# Patient Record
Sex: MALE | Employment: UNEMPLOYED | URBAN - METROPOLITAN AREA
[De-identification: names, ages, dates, MRNs, and addresses within clinical notes are randomized per-mention and may not be internally consistent; named-entity substitution may affect disease eponyms.]

---

## 2018-10-12 ENCOUNTER — TRANSFERRED RECORDS (OUTPATIENT)
Dept: HEALTH INFORMATION MANAGEMENT | Facility: CLINIC | Age: 48
End: 2018-10-12

## 2018-10-18 ENCOUNTER — HOSPITAL ENCOUNTER (OUTPATIENT)
Dept: PHYSICAL THERAPY | Facility: CLINIC | Age: 48
Setting detail: THERAPIES SERIES
End: 2018-10-18
Attending: PHYSICAL MEDICINE & REHABILITATION
Payer: COMMERCIAL

## 2018-10-18 PROCEDURE — 97162 PT EVAL MOD COMPLEX 30 MIN: CPT | Mod: GP | Performed by: PHYSICAL THERAPIST

## 2018-10-18 PROCEDURE — 97112 NEUROMUSCULAR REEDUCATION: CPT | Mod: GP | Performed by: PHYSICAL THERAPIST

## 2018-10-18 PROCEDURE — 40000719 ZZHC STATISTIC PT DEPARTMENT NEURO VISIT: Performed by: PHYSICAL THERAPIST

## 2018-10-18 ASSESSMENT — 6 MINUTE WALK TEST (6MWT)
COMMENTS: WITH SPC
TOTAL DISTANCE WALKED (FT): 500

## 2018-10-18 NOTE — PROGRESS NOTES
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 Originally diatnosed in 2009. S/p resection and chemo following multiple recurrances. Was doing fairly well until noticed increased weakness over the summer. Was working until the end of April but then had a seizure. Following, he had more diffiuclty with his L side weakness and difficulty with gait. Reports that he has a lot of stiffness in UE and LE along with significant pain in L UE   Prior level of function comment was ambulating without a cane and working prior to April   Current Community Support Family/friend caregiver   Patient role/Employment history Disabled  ()   Living environment Apartment/condo  (with spouse and 2 children)   Home/Community Accessibility Comments no stairs   Current Assistive Devices Standard Cane   ADL Devices Shower/Tub Grab Bar;Extended Tub Bench   Patient/Family Goals Statement Improve balance and gait   Fall Risk Screen   Fall screen completed by PT   Have you fallen 2 or more times in the past year? No   Have you fallen and had an injury in the past year? No   Timed Up and Go score (seconds) 22.3   Is patient a fall risk? Yes   Pain   Patient currently in pain Yes   Pain comments significant pain in L UE/shoulder, increasing with movement, walking. Prefers to have UE in a sling    Cognitive Status Examination   Orientation orientation to person, place and time   Cognitive Comment decreased eye contact, Rt gaze preference, neglect   Posture   Posture Forward head position   Posture Comments forward L shoulder, L UE in sling   Range of Motion (ROM)   ROM Comment tightness in L posterior LE including gastroc and hamstring   Strength   Strength Comments Grossly 4+/5, able to isolate, quad, hip and dorsiflexion activation   Bed Mobility   Bed Mobility Comments Min assist with supine>sit   Transfer Skills   Transfer Comments Mod I, requires UE support   Gait   Gait Comments Ambulates with Rt lean, maintains slight near flexion,decreased heel strike   Gait Special Tests   Gait Special Tests SIX MINUTE WALK TEST   Gait Special Tests Six Minute Walk Test   Feet 500 Feet   Comments with SPC   Balance Special Tests   Balance Special Tests Romberg   Balance Special Tests Romberg   Seconds 18 Seconds   Sensory Examination   Sensory Perception Comments reports numbness on L side of body   Coordination   Coordination Comments impaired LLE coordination   Muscle Tone   Muscle Tone Comments Increased tone in LLE into hip extension, has spasms into flexion   Planned Therapy Interventions   Planned Therapy Interventions balance training;bed mobility training;gait training;neuromuscular re-education;motor coordination training;ROM;strengthening;stretching   Clinical Impression   Criteria for Skilled Therapeutic Interventions Met yes, treatment indicated   PT Diagnosis Impaired balance and gait   Influenced by the following impairments weakness, pain, instability, tone, numbness   Functional limitations due to impairments difficulty with bed mobility, transfers, gait, fall risk   Clinical Presentation Evolving/Changing   Clinical Presentation Rationale recent onset, fluctuating pain and tone   Clinical Decision Making (Complexity) Moderate complexity   Therapy Frequency 2 times/day   Predicted Duration of  Therapy Intervention (days/wks) 60 days   Risk & Benefits of therapy have been explained Yes   Patient, Family & other staff in agreement with plan of care Yes   GOALS   PT Eval Goals 1;2;3   Goal 1   Goal Identifier 6MWT   Goal Description The client will ambulate >800' in 6 minutes, demonstrating an improvement in activity tolerance, thereby improving ability to ambulate in the community.   Target Date 12/17/18   Goal 2   Goal Identifier Romberg   Goal Description Due to improvements in balance, the pt will able to stand NBOS EC for 30 seconds.   Target Date 12/17/18   Goal 3   Goal Identifier HEP   Goal Description The client will be independent with a HEP and complete at least 4 days/week focusing on improving balance, strength, manage tone and improve and activity tolerance in order to improving functional mobility outside of therapy.   Target Date 12/17/18   Total Evaluation Time   Total Evaluation Time (Minutes) 40

## 2018-10-23 ENCOUNTER — HOSPITAL ENCOUNTER (OUTPATIENT)
Dept: OCCUPATIONAL THERAPY | Facility: CLINIC | Age: 48
Setting detail: THERAPIES SERIES
End: 2018-10-23
Attending: PHYSICAL MEDICINE & REHABILITATION
Payer: COMMERCIAL

## 2018-10-23 PROCEDURE — 40000125 ZZHC STATISTIC OT OUTPT VISIT: Performed by: REHABILITATION PRACTITIONER

## 2018-10-23 PROCEDURE — 97112 NEUROMUSCULAR REEDUCATION: CPT | Mod: GO | Performed by: REHABILITATION PRACTITIONER

## 2018-10-23 PROCEDURE — 97166 OT EVAL MOD COMPLEX 45 MIN: CPT | Mod: GO | Performed by: REHABILITATION PRACTITIONER

## 2018-10-24 NOTE — PROGRESS NOTES
10/23/18 0700   Quick Adds   Type of Visit Initial Outpatient Occupational Therapy Evaluation       Present Yes   Language Vietnamese  (Yes)   General Information   Start Of Care Date 10/23/18   Referring Physician Ute Bryant   Orders Evaluate and treat as indicated   Orders Date 10/12/18   Medical Diagnosis Malignant neoplasm overlapping brain site; CVA, unspecified mechanism; Left spastic hemiparesis   Onset of Illness/Injury or Date of Surgery 10/12/18   Precautions/Limitations Fall precautions;Other (see comments)  (pt has 1 finger width of shoulder sublux in LUE)   Surgical/Medical History Reviewed Yes   Additional Occupational Profile Info/Pertinent History of Current Problem The pt presents with with 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 Originally diatnosed in 2009. S/p resection and chemo following multiple recurrances. Was doing fairly well until noticed increased weakness over the summer. Was working until the end of April but then had a seizure. Following, he had more diffiuclty with his L side weakness and difficulty with gait. Reports that he has a lot of stiffness in UE and LE along with significant pain in L UE   Comments/Observations Wife reports that she is able to help pt with ADL/IADL when she is home but has to leave pt to watch their 2 children when she goes to work.  Wife reports fears of pt falling when she is gone.    Role/Living Environment   Current Community Support Family/friend caregiver   Patient role/Employment history Disabled  (currently not working but was working as a )   Community/Avocational Activities Pt wants to be able to get back to working and doing things for himself again.    Current Living Environment Apartment/condo   Number of Stairs to Enter Home no stairs   Primary Bathroom Set Up/Equipment Tub grab bar;Tub/Shower combo;Extended tub bench   Prior Level - ADLS Independent   Prior Responsibilities -  "IADL Work  (Pt became upset/tearful when asked about IADL tasks)   Current Assistive Devices - Mobility Standard cane   Role/Living Environment Comments Lives with wife and 2 children. Wife works.    Patient/family Goals Statement \"get back to doing everything\" ADL/IADL tasks (work, bathing, dressing are main areas)   Pain   Patient currently in pain Yes   Pain location LUE   (shoulder joint)   Pain rating 8/10 at rest    Pain description comment pain increases with movement of LUE   Fall Risk Screen   Fall screen completed by PT   Have you fallen 2 or more times in the past year? No   Have you fallen and had an injury in the past year? No   Is patient a fall risk? Yes   Cognitive Status Examination   Orientation Orientation to person, place and time   Level of Consciousness Alert   Follows Commands and Answers Questions 100% of the time;Able to follow single-step instructions   Personal Safety and Judgment Intact   Memory Intact   Cognitive Comment Right gaze preference, decreased awareness of left UE, anticipate inattention.  Cognition difficult to assess due to language barrier, will further asses through functional tasks.     Visual Perception   Visual Perception No deficits were identified   Sensation   Sensation Comments may need to assess LUE   Posture   Posture Forward head position   Posture Comments Pt reports that it is hard for him to keep is head up and looing forward   Range of Motion (ROM)   ROM Quick Adds Scapula;Shoulder, Left;Hand, General   ROM Comments 1/5 for shoulder flexion and adduction   Left Scapula ABduction ROM   Left Scapula ABduction AROM restricted   Left Scapula ADduction ROM   Left Scapula ADduction PROM restricted  (tightness/pain)   Left Scapula ADduction AROM restricted  (1/5)   Left Shoulder Flexion ROM   Left Shoulder Flexion PROM - degrees 45 degress   (passive)   Left Shoulder Flexion AROM - degrees 1/5    Left Shoulder ABduction ROM   Left Shoulder ABduction PROM - degrees 40 " degrees  (Passsive)   Left Shoulder External Rotation ROM   Left Shoulder External Rotation PROM - degrees to neutral, passively   Hand, General ROM   Hand, General Left Hand ROM   Left Hand AROM 0/5    Left Hand PROM full range    Hand, Detailed ROM   Hand Detailed ROM Left Hand AROM   Left Hand AROM   Left Thumb AROM - degrees at rest: 46 degrees flexion MP   Left Digit 2 AROM - degrees at rest: 60 degrees flexion MP; 60 degrees PIP   Left Digit 3 AROM - degrees at rest: 55 degrees flexion MP; 65 degrees flexion PIP   Left Digit 4 AROM - degrees at rest: 65 degrees flexion MP; 70 degress flexion PIP   Left Digit 5 AROM - degrees at rest: 65 degrees flexion MP; 75 degrees flexion PIP    Left Hand PROM   Left Thumb PROM - degrees Full range   (Passive )   Left Digit 2 PROM - degrees Full range    Left Digit 3 PROM - degrees Full range    Left Digit 4 PROM - degrees Full range    Left Digit 5 PROM - degrees Full range    Hand Strength   Hand Strength Comments Unable to perform any  or pinch assessments, no functional hand movement on left UE   Coordination   Coordination Comments No functional hand movement for coordination in left UE   Functional Mobility   Functional Mobility Comments uses standard cane    Transfer Skill   Level of [place]: Transfers stand-by assist   Transfer Skill: Toilet Transfer   Level of [place]: Toilet stand-by assist   Bathing   Level of [place] - Bathing minimum assist (75% patients effort)   Bathing Comments Needs help turning shower on/off    Upper Body Dressing   Level of [place]: Dress Upper Body dependent (less than 25% patients effort)   Upper Body Dressing Comments wife helps with dressing    Lower Body Dressing   Level of [place]: Dress Lower Body maximum assist (25% patients effort)   Toileting   Level of [place]: Toilet stand-by assist   Grooming   Level of [place]: Grooming independent   Eating/Self-Feeding   Level of [place]: Eating  minimum assist (75% patients effort)   Activity Tolerance   Activity Tolerance severe pain limits tolerance    Instrumental Activities of Daily Living Assessment   IADL Assessment/Observations Wife is currently helping with ADL/IADL tasks.  Pt is currently unable to work and want to get back to job as a .  Pt and wife report that he currently needs help with bathing, dressing, and IADL tasks.    Planned Therapy Interventions   Planned Therapy Interventions ADL training;IADL training;Joint mobilization;Neuromuscular re-education   Planned Modalities Hot/cold packs;Electrical stimulation;TENS   Adult OT Eval Goals   OT Eval Goals (Adult) 1;2;3;4;5;6;7   OT Goal 1   Goal Identifier 1-Dressing    Goal Description Pt will demonstrate kristopher dressing technique and be mod I with AE in UB and LB dressing for improved independence at home.   Target Date 01/22/18   OT Goal 2   Goal Identifier 2- LUE HEP    Goal Description Pt to demonstrate mod I with stretching HEP to improve overall ROM in LUE for improved independence with ADL/IADL tasks.     Target Date 01/22/18   OT Goal 3   Goal Identifier 3-Splint    Goal Description Pt with demonstrate mod independence with don/doffing splint for LUE for tone management and prevention of deformities to aid in increased independence for dressing tasks.    Target Date 01/22/18   OT Goal 4   Goal Identifier 4-Eating    Goal Description Pt will demonstrate mod I with eating using appropriate AE and one handed techniques for increased independence in meal prep and eating tasks.    Target Date 01/22/18   OT Goal 5   Goal Identifier 5 Fatigue/Pain management    Goal Description Pt will verbalize 3 strategies for pain/fatigue management for improved indepencence with dressing and IADL tasks.    Target Date 01/22/18   OT Goal 6   Goal Identifier 6 Inattention   Goal Description Pt will utilize 1-2 strategies to improve attention to left extra personal space and left UE to improve safety  with dynamic balance 3/3 trials   Target Date 01/22/18   OT Goal 7   Goal Identifier 7 simple meal prep   Goal Description Pt will demonstrate mod I with simple meal prep task, using adaptive equipment and safety kitchen mobility strategies.    Target Date 01/22/18   Clinical Impression   Criteria for Skilled Therapeutic Interventions Met Yes, treatment indicated   OT Diagnosis impaired ADL/IADL independence and safety    Influenced by the following impairments flaccid LUE, L shoulder subluxation, generalized weakness and fatigue, severe pain in LUE    Assessment of Occupational Performance 3-5 Performance Deficits   Identified Performance Deficits affects ADL/IADL, household/community mobility, work, recreational activities   Clinical Decision Making (Complexity) Moderate complexity   Therapy Frequency 2x/week for 45 min sessions   Predicted Duration of Therapy Intervention (days/wks) 12 weeks    Risks and Benefits of Treatment have been explained. Yes   Patient, Family & other staff in agreement with plan of care Yes   Clinical Impression Comments Pt's flaccid LUE, L shoulder sublux, and severe pain have significantly impaired independence in ADL/IADl/work tasks.    Education Assessment   Barriers To Learning Physical;Emotional   Preferred Learning Style Demonstration;Pictures/video;Listening  (listening when  present )   Total Evaluation Time   Total Evaluation Time 40

## 2018-10-31 ENCOUNTER — HOSPITAL ENCOUNTER (OUTPATIENT)
Dept: PHYSICAL THERAPY | Facility: CLINIC | Age: 48
Setting detail: THERAPIES SERIES
End: 2018-10-31
Attending: PHYSICAL MEDICINE & REHABILITATION
Payer: COMMERCIAL

## 2018-10-31 PROCEDURE — 40000360 ZZHC STATISTIC PT CANCER REHAB VISIT: Performed by: PHYSICAL THERAPIST

## 2018-10-31 PROCEDURE — 97116 GAIT TRAINING THERAPY: CPT | Mod: GP | Performed by: PHYSICAL THERAPIST

## 2018-10-31 PROCEDURE — 97110 THERAPEUTIC EXERCISES: CPT | Mod: GP | Performed by: PHYSICAL THERAPIST

## 2018-10-31 PROCEDURE — 97112 NEUROMUSCULAR REEDUCATION: CPT | Mod: GP | Performed by: PHYSICAL THERAPIST

## 2018-11-07 ENCOUNTER — HOSPITAL ENCOUNTER (OUTPATIENT)
Dept: OCCUPATIONAL THERAPY | Facility: CLINIC | Age: 48
Setting detail: THERAPIES SERIES
End: 2018-11-07
Attending: PHYSICAL MEDICINE & REHABILITATION
Payer: COMMERCIAL

## 2018-11-07 ENCOUNTER — HOSPITAL ENCOUNTER (OUTPATIENT)
Dept: PHYSICAL THERAPY | Facility: CLINIC | Age: 48
Setting detail: THERAPIES SERIES
End: 2018-11-07
Attending: PHYSICAL MEDICINE & REHABILITATION
Payer: COMMERCIAL

## 2018-11-07 PROCEDURE — 97112 NEUROMUSCULAR REEDUCATION: CPT | Mod: GO | Performed by: OCCUPATIONAL THERAPIST

## 2018-11-07 PROCEDURE — 97110 THERAPEUTIC EXERCISES: CPT | Mod: GO | Performed by: OCCUPATIONAL THERAPIST

## 2018-11-07 PROCEDURE — 97116 GAIT TRAINING THERAPY: CPT | Mod: GP | Performed by: PHYSICAL THERAPIST

## 2018-11-07 PROCEDURE — 97535 SELF CARE MNGMENT TRAINING: CPT | Mod: GO | Performed by: OCCUPATIONAL THERAPIST

## 2018-11-07 PROCEDURE — 97110 THERAPEUTIC EXERCISES: CPT | Mod: GP | Performed by: PHYSICAL THERAPIST

## 2018-11-07 PROCEDURE — 97112 NEUROMUSCULAR REEDUCATION: CPT | Mod: GP | Performed by: PHYSICAL THERAPIST

## 2018-11-07 PROCEDURE — 40000719 ZZHC STATISTIC PT DEPARTMENT NEURO VISIT: Performed by: PHYSICAL THERAPIST

## 2018-11-07 PROCEDURE — 40000125 ZZHC STATISTIC OT OUTPT VISIT: Performed by: OCCUPATIONAL THERAPIST

## 2018-11-09 ENCOUNTER — HOSPITAL ENCOUNTER (OUTPATIENT)
Dept: OCCUPATIONAL THERAPY | Facility: CLINIC | Age: 48
Setting detail: THERAPIES SERIES
End: 2018-11-09
Attending: PHYSICAL MEDICINE & REHABILITATION
Payer: COMMERCIAL

## 2018-11-09 ENCOUNTER — HOSPITAL ENCOUNTER (OUTPATIENT)
Dept: PHYSICAL THERAPY | Facility: CLINIC | Age: 48
Setting detail: THERAPIES SERIES
End: 2018-11-09
Attending: PHYSICAL MEDICINE & REHABILITATION
Payer: COMMERCIAL

## 2018-11-09 PROCEDURE — 97112 NEUROMUSCULAR REEDUCATION: CPT | Mod: GP | Performed by: PHYSICAL THERAPIST

## 2018-11-09 PROCEDURE — 40000719 ZZHC STATISTIC PT DEPARTMENT NEURO VISIT: Performed by: PHYSICAL THERAPIST

## 2018-11-09 PROCEDURE — 97112 NEUROMUSCULAR REEDUCATION: CPT | Mod: GO | Performed by: OCCUPATIONAL THERAPIST

## 2018-11-09 PROCEDURE — 97110 THERAPEUTIC EXERCISES: CPT | Mod: GP | Performed by: PHYSICAL THERAPIST

## 2018-11-09 PROCEDURE — 97110 THERAPEUTIC EXERCISES: CPT | Mod: GO | Performed by: OCCUPATIONAL THERAPIST

## 2018-11-09 PROCEDURE — 40000125 ZZHC STATISTIC OT OUTPT VISIT: Performed by: OCCUPATIONAL THERAPIST

## 2018-11-09 PROCEDURE — 97116 GAIT TRAINING THERAPY: CPT | Mod: GP | Performed by: PHYSICAL THERAPIST

## 2018-11-13 ENCOUNTER — HOSPITAL ENCOUNTER (OUTPATIENT)
Dept: OCCUPATIONAL THERAPY | Facility: CLINIC | Age: 48
Setting detail: THERAPIES SERIES
End: 2018-11-13
Attending: PHYSICAL MEDICINE & REHABILITATION
Payer: COMMERCIAL

## 2018-11-13 PROCEDURE — 97530 THERAPEUTIC ACTIVITIES: CPT | Mod: GO | Performed by: OCCUPATIONAL THERAPIST

## 2018-11-13 PROCEDURE — 40000125 ZZHC STATISTIC OT OUTPT VISIT: Performed by: OCCUPATIONAL THERAPIST

## 2018-11-15 ENCOUNTER — HOSPITAL ENCOUNTER (OUTPATIENT)
Dept: PHYSICAL THERAPY | Facility: CLINIC | Age: 48
Setting detail: THERAPIES SERIES
End: 2018-11-15
Attending: PHYSICAL MEDICINE & REHABILITATION
Payer: COMMERCIAL

## 2018-11-15 PROCEDURE — 97112 NEUROMUSCULAR REEDUCATION: CPT | Mod: GP | Performed by: PHYSICAL THERAPIST

## 2018-11-15 PROCEDURE — 97110 THERAPEUTIC EXERCISES: CPT | Mod: GP | Performed by: PHYSICAL THERAPIST

## 2018-11-15 PROCEDURE — 97116 GAIT TRAINING THERAPY: CPT | Mod: GP | Performed by: PHYSICAL THERAPIST

## 2018-11-15 PROCEDURE — 40000719 ZZHC STATISTIC PT DEPARTMENT NEURO VISIT: Performed by: PHYSICAL THERAPIST

## 2018-11-20 ENCOUNTER — HOSPITAL ENCOUNTER (OUTPATIENT)
Dept: PHYSICAL THERAPY | Facility: CLINIC | Age: 48
Setting detail: THERAPIES SERIES
End: 2018-11-20
Attending: PHYSICAL MEDICINE & REHABILITATION
Payer: COMMERCIAL

## 2018-11-20 PROCEDURE — 97112 NEUROMUSCULAR REEDUCATION: CPT | Mod: GP | Performed by: PHYSICAL THERAPIST

## 2018-11-20 PROCEDURE — 97110 THERAPEUTIC EXERCISES: CPT | Mod: GP | Performed by: PHYSICAL THERAPIST

## 2018-11-20 PROCEDURE — 97116 GAIT TRAINING THERAPY: CPT | Mod: GP | Performed by: PHYSICAL THERAPIST

## 2018-11-20 PROCEDURE — 40000719 ZZHC STATISTIC PT DEPARTMENT NEURO VISIT: Performed by: PHYSICAL THERAPIST

## 2018-11-21 ENCOUNTER — HOSPITAL ENCOUNTER (OUTPATIENT)
Dept: PHYSICAL THERAPY | Facility: CLINIC | Age: 48
Setting detail: THERAPIES SERIES
End: 2018-11-21
Attending: PHYSICAL MEDICINE & REHABILITATION
Payer: COMMERCIAL

## 2018-11-21 ENCOUNTER — HOSPITAL ENCOUNTER (OUTPATIENT)
Dept: OCCUPATIONAL THERAPY | Facility: CLINIC | Age: 48
Setting detail: THERAPIES SERIES
End: 2018-11-21
Attending: PHYSICAL MEDICINE & REHABILITATION
Payer: COMMERCIAL

## 2018-11-21 PROCEDURE — 97110 THERAPEUTIC EXERCISES: CPT | Mod: GP,XU | Performed by: PHYSICAL THERAPIST

## 2018-11-21 PROCEDURE — 40000125 ZZHC STATISTIC OT OUTPT VISIT: Performed by: REHABILITATION PRACTITIONER

## 2018-11-21 PROCEDURE — 97112 NEUROMUSCULAR REEDUCATION: CPT | Mod: GP | Performed by: PHYSICAL THERAPIST

## 2018-11-21 PROCEDURE — 97760 ORTHOTIC MGMT&TRAING 1ST ENC: CPT | Mod: GO | Performed by: REHABILITATION PRACTITIONER

## 2018-11-21 PROCEDURE — 97116 GAIT TRAINING THERAPY: CPT | Mod: GP | Performed by: PHYSICAL THERAPIST

## 2018-11-21 PROCEDURE — 97112 NEUROMUSCULAR REEDUCATION: CPT | Mod: GO | Performed by: REHABILITATION PRACTITIONER

## 2018-11-21 PROCEDURE — 40000719 ZZHC STATISTIC PT DEPARTMENT NEURO VISIT: Performed by: PHYSICAL THERAPIST

## 2018-11-28 ENCOUNTER — HOSPITAL ENCOUNTER (OUTPATIENT)
Dept: OCCUPATIONAL THERAPY | Facility: CLINIC | Age: 48
Setting detail: THERAPIES SERIES
End: 2018-11-28
Attending: PHYSICAL MEDICINE & REHABILITATION
Payer: COMMERCIAL

## 2018-11-28 ENCOUNTER — HOSPITAL ENCOUNTER (OUTPATIENT)
Dept: PHYSICAL THERAPY | Facility: CLINIC | Age: 48
Setting detail: THERAPIES SERIES
End: 2018-11-28
Attending: PHYSICAL MEDICINE & REHABILITATION
Payer: COMMERCIAL

## 2018-11-28 PROCEDURE — 97112 NEUROMUSCULAR REEDUCATION: CPT | Mod: GO | Performed by: REHABILITATION PRACTITIONER

## 2018-11-28 PROCEDURE — 97110 THERAPEUTIC EXERCISES: CPT | Mod: GP | Performed by: PHYSICAL THERAPIST

## 2018-11-28 PROCEDURE — 40000719 ZZHC STATISTIC PT DEPARTMENT NEURO VISIT: Performed by: PHYSICAL THERAPIST

## 2018-11-28 PROCEDURE — 40000125 ZZHC STATISTIC OT OUTPT VISIT: Performed by: REHABILITATION PRACTITIONER

## 2018-11-28 PROCEDURE — 97035 APP MDLTY 1+ULTRASOUND EA 15: CPT | Mod: GO | Performed by: REHABILITATION PRACTITIONER

## 2018-11-28 PROCEDURE — 97535 SELF CARE MNGMENT TRAINING: CPT | Mod: GO | Performed by: REHABILITATION PRACTITIONER

## 2018-11-28 PROCEDURE — 97112 NEUROMUSCULAR REEDUCATION: CPT | Mod: GP | Performed by: PHYSICAL THERAPIST

## 2018-11-28 PROCEDURE — 97116 GAIT TRAINING THERAPY: CPT | Mod: GP | Performed by: PHYSICAL THERAPIST

## 2018-12-05 ENCOUNTER — HOSPITAL ENCOUNTER (OUTPATIENT)
Dept: OCCUPATIONAL THERAPY | Facility: CLINIC | Age: 48
Setting detail: THERAPIES SERIES
End: 2018-12-05
Attending: PHYSICAL MEDICINE & REHABILITATION
Payer: COMMERCIAL

## 2018-12-05 ENCOUNTER — HOSPITAL ENCOUNTER (OUTPATIENT)
Dept: PHYSICAL THERAPY | Facility: CLINIC | Age: 48
Setting detail: THERAPIES SERIES
End: 2018-12-05
Attending: PHYSICAL MEDICINE & REHABILITATION
Payer: COMMERCIAL

## 2018-12-05 PROCEDURE — 97535 SELF CARE MNGMENT TRAINING: CPT | Mod: GO,XU | Performed by: REHABILITATION PRACTITIONER

## 2018-12-05 PROCEDURE — 97116 GAIT TRAINING THERAPY: CPT | Mod: GP,XU | Performed by: PHYSICAL THERAPIST

## 2018-12-05 PROCEDURE — 40000125 ZZHC STATISTIC OT OUTPT VISIT: Performed by: REHABILITATION PRACTITIONER

## 2018-12-05 PROCEDURE — 97112 NEUROMUSCULAR REEDUCATION: CPT | Mod: GP,XU | Performed by: PHYSICAL THERAPIST

## 2018-12-05 PROCEDURE — 97110 THERAPEUTIC EXERCISES: CPT | Mod: GP | Performed by: PHYSICAL THERAPIST

## 2018-12-05 PROCEDURE — 97530 THERAPEUTIC ACTIVITIES: CPT | Mod: GO | Performed by: REHABILITATION PRACTITIONER

## 2018-12-05 PROCEDURE — 40000719 ZZHC STATISTIC PT DEPARTMENT NEURO VISIT: Performed by: PHYSICAL THERAPIST

## 2018-12-05 PROCEDURE — 97760 ORTHOTIC MGMT&TRAING 1ST ENC: CPT | Mod: GO | Performed by: REHABILITATION PRACTITIONER

## 2018-12-13 ENCOUNTER — HOSPITAL ENCOUNTER (OUTPATIENT)
Dept: PHYSICAL THERAPY | Facility: CLINIC | Age: 48
Setting detail: THERAPIES SERIES
End: 2018-12-13
Attending: PHYSICAL MEDICINE & REHABILITATION
Payer: COMMERCIAL

## 2018-12-13 ENCOUNTER — HOSPITAL ENCOUNTER (OUTPATIENT)
Dept: OCCUPATIONAL THERAPY | Facility: CLINIC | Age: 48
Setting detail: THERAPIES SERIES
End: 2018-12-13
Attending: PHYSICAL MEDICINE & REHABILITATION
Payer: COMMERCIAL

## 2018-12-13 PROCEDURE — 97112 NEUROMUSCULAR REEDUCATION: CPT | Mod: GP | Performed by: PHYSICAL THERAPIST

## 2018-12-13 PROCEDURE — 97110 THERAPEUTIC EXERCISES: CPT | Mod: GP,XU | Performed by: PHYSICAL THERAPIST

## 2018-12-13 PROCEDURE — 97763 ORTHC/PROSTC MGMT SBSQ ENC: CPT | Mod: GO | Performed by: REHABILITATION PRACTITIONER

## 2018-12-14 NOTE — PROGRESS NOTES
Outpatient Physical Therapy Progress Note     Patient: Hugh Isabel  : 1970    Beginning/End Dates of Reporting Period:  10/12/18 to 2018    Referring Provider: Ute Smith Diagnosis: Impaired balance and gait     Client Self Report: The pt is tired today. Was called in to the hospital all day for testing because they though a test showed bleeding in the brain. Turned out to be an error.     Objective Measurements:  Objective Measure: 6MWT  Details: 700  Objective Measure: Romberg  Details: 10 seconds                   Goals:  Goal Identifier 6MWT   Goal Description The client will ambulate >800' in 6 minutes, demonstrating an improvement in activity tolerance, thereby improving ability to ambulate in the community.   Target Date 18   Date Met      Progress:     Goal Identifier Romberg   Goal Description Due to improvements in balance, the pt will able to stand NBOS EC for 30 seconds.   Target Date 18   Date Met      Progress:     Goal Identifier HEP   Goal Description The client will be independent with a HEP and complete at least 4 days/week focusing on improving balance, strength, manage tone and improve and activity tolerance in order to improving functional mobility outside of therapy.   Target Date 18   Date Met      Progress:   Progress Toward Goals:   Progress this reporting period: The pt has demonstrated improvements with overall balance and mobility. His distance on the 6MWT improved by 200'. The pt also is beginning to walk short distances without an assistive device. The pt continues to have difficulty with static balance, coordination and overall posture/positional sense.     Plan:  Continue therapy per current plan of care.    Discharge:  No

## 2018-12-20 ENCOUNTER — HOSPITAL ENCOUNTER (OUTPATIENT)
Dept: OCCUPATIONAL THERAPY | Facility: CLINIC | Age: 48
Setting detail: THERAPIES SERIES
End: 2018-12-20
Attending: PHYSICAL MEDICINE & REHABILITATION
Payer: COMMERCIAL

## 2018-12-20 ENCOUNTER — HOSPITAL ENCOUNTER (OUTPATIENT)
Dept: PHYSICAL THERAPY | Facility: CLINIC | Age: 48
Setting detail: THERAPIES SERIES
End: 2018-12-20
Attending: PHYSICAL MEDICINE & REHABILITATION
Payer: COMMERCIAL

## 2018-12-20 PROCEDURE — 97530 THERAPEUTIC ACTIVITIES: CPT | Mod: GO | Performed by: REHABILITATION PRACTITIONER

## 2018-12-20 PROCEDURE — 97535 SELF CARE MNGMENT TRAINING: CPT | Mod: GO,XU | Performed by: REHABILITATION PRACTITIONER

## 2018-12-20 PROCEDURE — 97116 GAIT TRAINING THERAPY: CPT | Mod: GP | Performed by: PHYSICAL THERAPIST

## 2018-12-27 ENCOUNTER — HOSPITAL ENCOUNTER (OUTPATIENT)
Dept: PHYSICAL THERAPY | Facility: CLINIC | Age: 48
Setting detail: THERAPIES SERIES
End: 2018-12-27
Attending: PHYSICAL MEDICINE & REHABILITATION
Payer: COMMERCIAL

## 2018-12-27 PROCEDURE — 97116 GAIT TRAINING THERAPY: CPT | Mod: GP | Performed by: PHYSICAL THERAPIST

## 2018-12-28 ENCOUNTER — HOSPITAL ENCOUNTER (OUTPATIENT)
Dept: PHYSICAL THERAPY | Facility: CLINIC | Age: 48
Setting detail: THERAPIES SERIES
End: 2018-12-28
Attending: PHYSICAL MEDICINE & REHABILITATION
Payer: COMMERCIAL

## 2018-12-28 ENCOUNTER — HOSPITAL ENCOUNTER (OUTPATIENT)
Dept: OCCUPATIONAL THERAPY | Facility: CLINIC | Age: 48
Setting detail: THERAPIES SERIES
End: 2018-12-28
Attending: PHYSICAL MEDICINE & REHABILITATION
Payer: COMMERCIAL

## 2018-12-28 PROCEDURE — 97116 GAIT TRAINING THERAPY: CPT | Mod: GP | Performed by: PHYSICAL THERAPIST

## 2018-12-28 PROCEDURE — 97535 SELF CARE MNGMENT TRAINING: CPT | Mod: GO | Performed by: OCCUPATIONAL THERAPIST

## 2018-12-28 PROCEDURE — 97112 NEUROMUSCULAR REEDUCATION: CPT | Mod: GP | Performed by: PHYSICAL THERAPIST

## 2019-01-03 ENCOUNTER — HOSPITAL ENCOUNTER (OUTPATIENT)
Dept: PHYSICAL THERAPY | Facility: CLINIC | Age: 49
Setting detail: THERAPIES SERIES
End: 2019-01-03
Attending: PHYSICAL MEDICINE & REHABILITATION
Payer: COMMERCIAL

## 2019-01-03 PROCEDURE — 97116 GAIT TRAINING THERAPY: CPT | Mod: GP | Performed by: PHYSICAL THERAPIST

## 2019-01-03 PROCEDURE — 97112 NEUROMUSCULAR REEDUCATION: CPT | Mod: GP | Performed by: PHYSICAL THERAPIST

## 2019-05-07 NOTE — PROGRESS NOTES
"Outpatient Occupational Therapy Discharge Note     Patient: Hugh Isabel  : 1970    Beginning/End Dates of Reporting Period:   10/23/18 to 2018    Referring Provider: Ute Smith Diagnosis: Malignant neoplasm overlapping brain site; CVA, unspecified mechanism; Left spastic hemiparesis    Client Self Report: Patient states that he only has fatigue he has is the \"pressure\" from sling/clothes.  He denies need to go over fatigue managment strategies, is napping for 15-20 minutes at home when needed.  Patient/wife have no concerns with the Dynasplint.     Objective Measurements:    Objective Measure: LUE PROM seated  Details: Shoulder flexion: 45 degrees; Abduction: 40 degrees     Objective Measure: L digits MP flexion at rest  Details: thumb: 45 deg; 2nd: 60 deg; 3rd: 55 deg; 4th: 65 deg; 5th: 65 deg      Objective Measure: L digits PIP flexion at rest    Details:  2nd: 60 deg; 3rd: 65 deg; 4th: 70 deg; 5th: 75 deg      Objective Measure: Dynavision Mode A   Details: 21, 31, 19,  (average is 52+)       Goals:     Goal Identifier 1-Dressing    Goal Description Pt will demonstrate kristopher dressing technique and be mod I with AE in UB and LB dressing for improved independence at home.   Target Date 18   Date Met      Progress:     Goal Identifier 2- LUE HEP    Goal Description Pt to demonstrate mod I with stretching HEP to improve overall ROM in LUE for improved independence with ADL/IADL tasks.     Target Date 18   Date Met      Progress:     Goal Identifier 3-Splint    Goal Description Pt with demonstrate mod independence with don/doffing splint for LUE for tone management and prevention of deformities to aid in increased independence for dressing tasks.    Target Date 18   Date Met      Progress:     Goal Identifier 4-Eating    Goal Description Pt will demonstrate mod I with eating using appropriate AE and one handed techniques for increased independence in meal prep and " eating tasks.    Target Date 01/22/18   Date Met      Progress:     Goal Identifier 5 Fatigue/Pain management    Goal Description Pt will verbalize 3 strategies for pain/fatigue management for improved indepencence with dressing and IADL tasks.    Target Date 01/22/18   Date Met      Progress:     Goal Identifier 6 Inattention   Goal Description Pt will utilize 1-2 strategies to improve attention to left extra personal space and left UE to improve safety with dynamic balance 3/3 trials   Target Date 01/22/18   Date Met      Progress:     Goal Identifier 7 simple meal prep   Goal Description Pt will demonstrate mod I with simple meal prep task, using adaptive equipment and safety kitchen mobility strategies.    Target Date 01/22/18   Date Met      Progress:     Goal Identifier     Goal Description     Target Date     Date Met      Progress:     Progress Toward Goals:   Progress limited due to L neglect.  Patient was fitted for a dynamic hand/elbow splint through Dynasplint to prevent contractures and decrease tone.  Patient/wife eport ease with donning.  Patient educated in one handed dressing and feeding techniques (rocker knife, use of dycem, elastic shoe laces).  Trialed Giv[employer] sling due to shoulder subluxation but patient did not like (prefers to use standard sling).  Recommended shower chair to bathe to decrease fall risk and increase safety (but patient does not like to use as it feels uncomforable).  HEP given for SROM and patient reports completing.     Plan:  Discharge from therapy.    Discharge:    Reason for Discharge: Patient receiving outpatient services at [facility] due to insurance changes.    Discharge Plan:  Patient to continue with OP OT services at [facility].

## 2019-05-07 NOTE — ADDENDUM NOTE
Encounter addended by: Socorro Yuen, OT on: 5/7/2019 5:18 PM   Actions taken: Flowsheet data copied forward, Flowsheet accepted, Sign clinical note, Episode resolved

## 2025-01-04 ENCOUNTER — APPOINTMENT (OUTPATIENT)
Dept: CT IMAGING | Facility: CLINIC | Age: 55
End: 2025-01-04
Attending: EMERGENCY MEDICINE
Payer: COMMERCIAL

## 2025-01-04 ENCOUNTER — HOSPITAL ENCOUNTER (EMERGENCY)
Facility: CLINIC | Age: 55
Discharge: HOME | End: 2025-01-04
Attending: EMERGENCY MEDICINE
Payer: COMMERCIAL

## 2025-01-04 VITALS
RESPIRATION RATE: 20 BRPM | HEART RATE: 86 BPM | DIASTOLIC BLOOD PRESSURE: 110 MMHG | TEMPERATURE: 97.9 F | SYSTOLIC BLOOD PRESSURE: 147 MMHG | OXYGEN SATURATION: 98 %
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
## 2025-01-04 DIAGNOSIS — S09.90XA MINOR HEAD INJURY, INITIAL ENCOUNTER: ICD-10-CM

## 2025-01-04 DIAGNOSIS — S16.1XXA CERVICAL STRAIN, INITIAL ENCOUNTER: ICD-10-CM

## 2025-01-04 DIAGNOSIS — S01.81XA LACERATION OF FOREHEAD, INITIAL ENCOUNTER: ICD-10-CM

## 2025-01-04 DIAGNOSIS — I10 HYPERTENSION, UNSPECIFIED TYPE: ICD-10-CM

## 2025-01-04 DIAGNOSIS — D64.9 ANEMIA, UNSPECIFIED TYPE: ICD-10-CM

## 2025-01-04 LAB
ANION GAP SERPL CALCULATED.3IONS-SCNC: 10 MMOL/L (ref 7–15)
BASOPHILS # BLD AUTO: 0 10E3/UL (ref 0–0.2)
BASOPHILS NFR BLD AUTO: 0 %
BUN SERPL-MCNC: 13.9 MG/DL (ref 6–20)
CALCIUM SERPL-MCNC: 8.9 MG/DL (ref 8.8–10.4)
CHLORIDE SERPL-SCNC: 105 MMOL/L (ref 98–107)
CREAT SERPL-MCNC: 0.97 MG/DL (ref 0.67–1.17)
EGFRCR SERPLBLD CKD-EPI 2021: >90 ML/MIN/1.73M2
EOSINOPHIL # BLD AUTO: 0 10E3/UL (ref 0–0.7)
EOSINOPHIL NFR BLD AUTO: 1 %
ERYTHROCYTE [DISTWIDTH] IN BLOOD BY AUTOMATED COUNT: 12.5 % (ref 10–15)
GLUCOSE SERPL-MCNC: 118 MG/DL (ref 70–99)
HCO3 SERPL-SCNC: 27 MMOL/L (ref 22–29)
HCT VFR BLD AUTO: 36.7 % (ref 40–53)
HGB BLD-MCNC: 12.9 G/DL (ref 13.3–17.7)
IMM GRANULOCYTES # BLD: 0 10E3/UL
IMM GRANULOCYTES NFR BLD: 0 %
LYMPHOCYTES # BLD AUTO: 0.7 10E3/UL (ref 0.8–5.3)
LYMPHOCYTES NFR BLD AUTO: 12 %
MCH RBC QN AUTO: 33.2 PG (ref 26.5–33)
MCHC RBC AUTO-ENTMCNC: 35.1 G/DL (ref 31.5–36.5)
MCV RBC AUTO: 94 FL (ref 78–100)
MONOCYTES # BLD AUTO: 0.8 10E3/UL (ref 0–1.3)
MONOCYTES NFR BLD AUTO: 14 %
NEUTROPHILS # BLD AUTO: 3.9 10E3/UL (ref 1.6–8.3)
NEUTROPHILS NFR BLD AUTO: 73 %
NRBC # BLD AUTO: 0 10E3/UL
NRBC BLD AUTO-RTO: 0 /100
PLATELET # BLD AUTO: 107 10E3/UL (ref 150–450)
POTASSIUM SERPL-SCNC: 3.6 MMOL/L (ref 3.4–5.3)
RBC # BLD AUTO: 3.89 10E6/UL (ref 4.4–5.9)
SODIUM SERPL-SCNC: 142 MMOL/L (ref 135–145)
WBC # BLD AUTO: 5.4 10E3/UL (ref 4–11)

## 2025-01-04 PROCEDURE — 72125 CT NECK SPINE W/O DYE: CPT

## 2025-01-04 PROCEDURE — 36415 COLL VENOUS BLD VENIPUNCTURE: CPT | Performed by: EMERGENCY MEDICINE

## 2025-01-04 PROCEDURE — 85018 HEMOGLOBIN: CPT | Performed by: EMERGENCY MEDICINE

## 2025-01-04 PROCEDURE — 93005 ELECTROCARDIOGRAM TRACING: CPT

## 2025-01-04 PROCEDURE — 70450 CT HEAD/BRAIN W/O DYE: CPT

## 2025-01-04 PROCEDURE — 80048 BASIC METABOLIC PNL TOTAL CA: CPT | Performed by: EMERGENCY MEDICINE

## 2025-01-04 PROCEDURE — 85048 AUTOMATED LEUKOCYTE COUNT: CPT | Performed by: EMERGENCY MEDICINE

## 2025-01-04 PROCEDURE — 82310 ASSAY OF CALCIUM: CPT | Performed by: EMERGENCY MEDICINE

## 2025-01-04 PROCEDURE — 99285 EMERGENCY DEPT VISIT HI MDM: CPT | Mod: 25

## 2025-01-04 PROCEDURE — 85004 AUTOMATED DIFF WBC COUNT: CPT | Performed by: EMERGENCY MEDICINE

## 2025-01-04 RX ORDER — LACOSAMIDE 200 MG/1
200 TABLET ORAL
COMMUNITY
Start: 2024-08-02

## 2025-01-04 RX ORDER — ATORVASTATIN CALCIUM 10 MG/1
1 TABLET, FILM COATED ORAL DAILY
COMMUNITY
Start: 2023-01-24

## 2025-01-04 RX ORDER — LEVETIRACETAM 1000 MG/1
2 TABLET ORAL 2 TIMES DAILY
COMMUNITY
Start: 2024-09-26

## 2025-01-04 RX ORDER — DEXAMETHASONE 2 MG/1
TABLET ORAL
COMMUNITY

## 2025-01-04 RX ORDER — AMLODIPINE BESYLATE 10 MG/1
1 TABLET ORAL DAILY
COMMUNITY
Start: 2024-10-16

## 2025-01-04 RX ORDER — ZOLPIDEM TARTRATE 10 MG/1
10 TABLET ORAL
COMMUNITY
Start: 2024-10-16

## 2025-01-04 RX ORDER — METHOCARBAMOL 500 MG/1
500 TABLET, FILM COATED ORAL
COMMUNITY
Start: 2024-05-07

## 2025-01-04 RX ORDER — BACLOFEN 10 MG/1
TABLET ORAL
COMMUNITY
Start: 2024-09-24

## 2025-01-04 RX ORDER — FAMOTIDINE 20 MG/1
20 TABLET, FILM COATED ORAL
COMMUNITY
Start: 2024-11-22

## 2025-01-04 RX ORDER — LISINOPRIL 20 MG/1
TABLET ORAL
COMMUNITY
Start: 2024-10-16

## 2025-01-04 ASSESSMENT — ACTIVITIES OF DAILY LIVING (ADL)
ADLS_ACUITY_SCORE: 41

## 2025-01-04 ASSESSMENT — COLUMBIA-SUICIDE SEVERITY RATING SCALE - C-SSRS
2. HAVE YOU ACTUALLY HAD ANY THOUGHTS OF KILLING YOURSELF IN THE PAST MONTH?: NO
1. IN THE PAST MONTH, HAVE YOU WISHED YOU WERE DEAD OR WISHED YOU COULD GO TO SLEEP AND NOT WAKE UP?: NO
6. HAVE YOU EVER DONE ANYTHING, STARTED TO DO ANYTHING, OR PREPARED TO DO ANYTHING TO END YOUR LIFE?: NO

## 2025-01-04 NOTE — ED PROVIDER NOTES
Emergency Department Note      History of Present Illness     Chief Complaint   Fall    History provided by the patient, translated from Turkmen by a professional interpretor.    DONNA Isabel is a 54 year old male with a history of focal epilepsy, malignant neoplasm of parietal lobe, brain lesion, and CVA who presents to the ED via EMS for a fall. Per EMS report, the patient has history of brain tumor for 8 months and has altered gait at baseline. The patient had a mechanical fall this morning after losing his balance. He hit his head on the toilet, resulting in a laceration to the left side of his forehead. Upon arrival to the ED, the patient states that he fell while in the bathroom, but did not lose consciousness. Following the fall he developed pain at the site of his forehead laceration and his neck. He denies any headache or pain in his back, abdomen, or extremities. Patient reports that he has had 3 surgeries for his brain tumor through [facility] and is currently in chemo. His wife notes that he has left sided weakness and is unable to use his left hand. He is able to move his left leg, but has issues with balance. Does not use a walker. Patient denies any recent fever, vomit, or diarrhea. Further denies any known allergies to medications. Not on blood thinners.    Independent Historian   EMS as detailed above.    Review of External Notes   I reviewed neurology office visit from 12/5/2024.  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  He has associated left spastic hemiparesis and history of frequent falls.    Past Medical History     Medical History and Problem List   DVT  Ataxia  Bone loss  Dry eyes  Edema of left foot  Focal epilepsy  Hypertension  Insomnia  Left hemiparesis  Left ventricular hypertrophy  MDD  Malignant neoplasm of parietal lobe  Myopia of both eyes with astigmatism and presbyopia  Oligodendroglioma  Hyperlipidemia  Paraplegia  Pterygium of both  eyes  CVA  Brain lesion    Medications   Norvasc  Lipitor  Lioresal  Decadron  Pepcid  Vimpat  Keppra  Zestril  Robaxin  Ambien     Surgical History   No past surgical history on file.    Physical Exam     Patient Vitals for the past 24 hrs:   BP Temp Temp src Pulse Resp SpO2   01/04/25 1013 (!) 147/110 97.9  F (36.6  C) Oral 86 20 98 %   01/04/25 0726 (!) 160/115 97.9  F (36.6  C) Oral 105 24 98 %     Physical Exam  Vitals and nursing note reviewed.   Constitutional:       General: He is not in acute distress.     Appearance: He is ill-appearing (Chronically ill-appearing).   HENT:      Head: Normocephalic. Laceration present.        Right Ear: External ear normal.      Left Ear: External ear normal.      Nose: Nose normal.   Eyes:      Conjunctiva/sclera: Conjunctivae normal.   Cardiovascular:      Rate and Rhythm: Normal rate and regular rhythm.      Heart sounds: No murmur heard.  Pulmonary:      Effort: Pulmonary effort is normal. No respiratory distress.      Breath sounds: No wheezing, rhonchi or rales.   Abdominal:      General: Abdomen is flat. There is no distension.      Palpations: Abdomen is soft.      Tenderness: There is no abdominal tenderness. There is no guarding or rebound.   Musculoskeletal:         General: No swelling or deformity.      Cervical back: Normal range of motion and neck supple. Tenderness present.   Skin:     General: Skin is warm and dry.      Findings: No rash.   Neurological:      Mental Status: He is alert and oriented to person, place, and time.      Motor: Weakness (Left-sided hemiparesis) present.   Psychiatric:         Behavior: Behavior normal.           Diagnostics     Lab Results   Labs Ordered and Resulted from Time of ED Arrival to Time of ED Departure   BASIC METABOLIC PANEL - Abnormal       Result Value    Sodium 142      Potassium 3.6      Chloride 105      Carbon Dioxide (CO2) 27      Anion Gap 10      Urea Nitrogen 13.9      Creatinine 0.97      GFR Estimate >90       Calcium 8.9      Glucose 118 (*)    CBC WITH PLATELETS AND DIFFERENTIAL - Abnormal    WBC Count 5.4      RBC Count 3.89 (*)     Hemoglobin 12.9 (*)     Hematocrit 36.7 (*)     MCV 94      MCH 33.2 (*)     MCHC 35.1      RDW 12.5      Platelet Count 107 (*)     % Neutrophils 73      % Lymphocytes 12      % Monocytes 14      % Eosinophils 1      % Basophils 0      % Immature Granulocytes 0      NRBCs per 100 WBC 0      Absolute Neutrophils 3.9      Absolute Lymphocytes 0.7 (*)     Absolute Monocytes 0.8      Absolute Eosinophils 0.0      Absolute Basophils 0.0      Absolute Immature Granulocytes 0.0      Absolute NRBCs 0.0         Imaging   CT Cervical Spine w/o Contrast   Final Result   IMPRESSION:   1.  No acute cervical spine fracture.      CT Head w/o Contrast   Final Result   IMPRESSION:   1.  No acute intracranial hemorrhage.   2.  Scattered paranasal sinus mucosal thickening in right maxillary sinus air-fluid level, suggesting changes of acute sinusitis.          EKG   ECG taken at 0824, ECG read at 0830  Normal sinus rhythm  Nonspecific ST and T wave abnormality  Abnormal ECG   Rate 86 bpm. MN interval 180 ms. QRS duration 76 ms. QT/QTc 336/402 ms. P-R-T axes 34 72 -24.    Independent Interpretation   CT Head: No intracranial hemorrhage.    ED Course      Medications Administered   Medications - No data to display    Procedures     Laceration Repair      Procedure: Laceration Repair    Indication: Laceration    Consent: Verbal    Tetanus status reviewed - last 11/01/2018    Location: Left Face     Length: 3 cm    Preparation: Irrigation with Sterile Saline.    Anesthesia/Sedation: Lidocaine - 1% and Lidocaine with Epinephrine - 1%      Treatment/Exploration: Wound explored, no foreign bodies found     Closure: The wound was closed with one layer. Skin/superficial layer was closed with 5 x 5-0 Nylon using Interrupted sutures.     Patient Status: The patient tolerated the procedure well: Yes. There were no  complications.       Discussion of Management   None    ED Course   ED Course as of 01/04/25 1027   Sat Jan 04, 2025   0759 I obtained history and examined the patient as noted above.     0910 I rechecked the patient.       Additional Documentation  None    Medical Decision Making / Diagnosis     CMS Diagnoses: None    MIPS       None    [facility]   Hugh Isabel is a 54 year old male who presents after a mechanical fall and head injury.  He has a laceration to the left forehead.  This was cleaned and repaired as noted.  Tdap is up-to-date.  He was complaining of neck pain so CT of the head and neck was performed and fortunately did not show any acute findings.  Lab work shows slight anemia which could be from blood loss from his laceration.  Otherwise his labs and EKG are unremarkable.  He was able to ambulate at his baseline ability.  We will plan to discharge home and recommend close follow-up with his primary care provider.  He should have his sutures removed in about 5 days.  We discussed return precautions.    Disposition   The patient was discharged.     Diagnosis     ICD-10-CM    1. Fall from slip, trip, or stumble, initial encounter  W01.0XXA       2. Laceration of forehead, initial encounter  S01.81XA       3. Minor head injury, initial encounter  S09.90XA       4. Cervical strain, initial encounter  S16.1XXA       5. Hypertension, unspecified type  I10       6. Anemia, unspecified type  D64.9            Discharge Medications   New Prescriptions    No medications on file         Scribe Disclosure:  YOON, Yadira Rice, am serving as a scribe at 7:50 AM on 1/4/2025 to document services personally performed by Alfonso Shankar MD based on my observations and the provider's statements to me.        Alfonso Shankar MD  01/04/25 1031

## 2025-01-04 NOTE — ED NOTES
Pt presents to room with uncontrolled laceration to his left forehead area. EMS dressing removed and direct pressure applied to wound with bleeding persisting. Pt denies pain on arrival.

## 2025-01-04 NOTE — ED TRIAGE NOTES
Pt BIB EMS. EMS reports pt has Hx of brain tumor and has altered gait at baseline. EMS reports pt fell this morning after tripping and hit his head on landing. EMS reports pt has a laceration to his forehead from the fall. Glucose 115 en route.

## 2025-01-04 NOTE — ED NOTES
Bed: ED11  Expected date:   Expected time:   Means of arrival:   Comments:  [place]: 54M, fall head lac

## 2025-01-05 LAB
ATRIAL RATE - MUSE: 86 BPM
DIASTOLIC BLOOD PRESSURE - MUSE: NORMAL MMHG
INTERPRETATION ECG - MUSE: NORMAL
P AXIS - MUSE: 34 DEGREES
PR INTERVAL - MUSE: 180 MS
QRS DURATION - MUSE: 76 MS
QT - MUSE: 336 MS
QTC - MUSE: 402 MS
R AXIS - MUSE: 72 DEGREES
SYSTOLIC BLOOD PRESSURE - MUSE: NORMAL MMHG
T AXIS - MUSE: -24 DEGREES
VENTRICULAR RATE- MUSE: 86 BPM